# Patient Record
Sex: FEMALE | HISPANIC OR LATINO | URBAN - METROPOLITAN AREA
[De-identification: names, ages, dates, MRNs, and addresses within clinical notes are randomized per-mention and may not be internally consistent; named-entity substitution may affect disease eponyms.]

---

## 2022-12-08 ENCOUNTER — OFFICE (OUTPATIENT)
Dept: URBAN - METROPOLITAN AREA CLINIC 94 | Facility: CLINIC | Age: 32
Setting detail: OPHTHALMOLOGY
End: 2022-12-08
Payer: COMMERCIAL

## 2022-12-08 DIAGNOSIS — H11.153: ICD-10-CM

## 2022-12-08 PROBLEM — E11.9 DM TYPE 1: Status: ACTIVE | Noted: 2022-12-08

## 2022-12-08 PROCEDURE — 92014 COMPRE OPH EXAM EST PT 1/>: CPT | Performed by: OPHTHALMOLOGY

## 2022-12-08 ASSESSMENT — KERATOMETRY
METHOD_AUTO_MANUAL: AUTO
OS_K1POWER_DIOPTERS: 41.00
OD_AXISANGLE_DEGREES: 091
OS_K2POWER_DIOPTERS: 42.50
OD_K1POWER_DIOPTERS: 41.50
OS_AXISANGLE_DEGREES: 090
OD_K2POWER_DIOPTERS: 42.75

## 2022-12-08 ASSESSMENT — CONFRONTATIONAL VISUAL FIELD TEST (CVF)
OD_FINDINGS: FULL
OS_FINDINGS: FULL

## 2022-12-08 ASSESSMENT — VISUAL ACUITY
OS_BCVA: 20/25+1
OD_BCVA: 20/25+

## 2022-12-08 ASSESSMENT — REFRACTION_AUTOREFRACTION
OS_CYLINDER: -0.25
OD_CYLINDER: -0.50
OS_SPHERE: +0.25
OS_AXIS: 016
OD_AXIS: 004
OD_SPHERE: +0.25

## 2022-12-08 ASSESSMENT — TONOMETRY
OS_IOP_MMHG: 17
OD_IOP_MMHG: 15

## 2022-12-08 ASSESSMENT — AXIALLENGTH_DERIVED
OD_AL: 24.108
OS_AL: 24.2013

## 2022-12-08 ASSESSMENT — SPHEQUIV_DERIVED
OD_SPHEQUIV: 0
OS_SPHEQUIV: 0.125

## 2023-04-19 ENCOUNTER — EMERGENCY (EMERGENCY)
Facility: HOSPITAL | Age: 33
LOS: 1 days | Discharge: HOME | End: 2023-04-19
Attending: STUDENT IN AN ORGANIZED HEALTH CARE EDUCATION/TRAINING PROGRAM
Payer: COMMERCIAL

## 2023-04-19 VITALS
RESPIRATION RATE: 20 BRPM | TEMPERATURE: 98 F | DIASTOLIC BLOOD PRESSURE: 81 MMHG | HEART RATE: 100 BPM | SYSTOLIC BLOOD PRESSURE: 140 MMHG | WEIGHT: 184.97 LBS | OXYGEN SATURATION: 98 %

## 2023-04-19 PROCEDURE — 99283 EMERGENCY DEPT VISIT LOW MDM: CPT

## 2023-04-19 PROCEDURE — 0225U NFCT DS DNA&RNA 21 SARSCOV2: CPT

## 2023-04-19 PROCEDURE — 99284 EMERGENCY DEPT VISIT MOD MDM: CPT

## 2023-04-19 NOTE — ED PROVIDER NOTE - NSFOLLOWUPINSTRUCTIONS_ED_ALL_ED_FT
Enfermedades virales en los niños  Viral Illness, Pediatric    Los virus son gérmenes diminutos que entran en el organismo de nina persona y causan enfermedades. Hay muchos tipos de virus diferentes y causan muchas clases de enfermedades. Las enfermedades virales son muy frecuentes en los niños. La mayoría de las enfermedades virales que afectan a los niños no son graves. Mi todas desaparecen sin tratamiento después de algunos días.    Los tipos de virus más comunes que afectan a los niños son los siguientes:    Virus del resfrío y la gripe.  Virus estomacales.  Virus que causan fiebre y erupciones cutáneas. Estos incluyen enfermedades bc el sarampión, la rubéola, la roséola, la quinta enfermedad y la varicela.    Además, las enfermedades virales abarcan afecciones graves, bc la infección por el VIH (virus de inmunodeficiencia humana) y el sida (síndrome de inmunodeficiencia adquirida). Se monsalve identificado unos pocos virus asociados con determinados tipos de cáncer.    ¿Cuáles son las causas?  Muchos tipos de virus pueden causar enfermedades. Los virus invaden las células del organismo del chilo, se multiplican y provocan que las células infectadas funcionen de manera anormal o mueran. Cuando estas células mueren, liberan más virus. Cuando esto ocurre, el chilo tiene síntomas de la enfermedad, y el virus sigue diseminándose a otras células. Si el virus asume la función de la célula, puede hacer que esta se divida y prolifere de manera descontrolada. Margaret ocurre cuando un virus causa cáncer.    Los diferentes virus ingresan al organismo de distintas formas. El chilo es más propenso a contraer un virus si está en contacto con otra persona infectada con un virus. Margaret puede ocurrir en el hogar, en la escuela o en la guardería infantil. El chilo puede contraer un virus de la siguiente forma:    Al inhalar gotitas que nina persona infectada liberó en el aire al toser o estornudar. Los virus del resfrío y de la gripe, así bc aquellos que causan fiebre y erupciones cutáneas, suelen diseminarse a través de estas gotitas.  Al tocar cualquier objeto que tenga el virus (esté contaminado) y luego tocarse la nariz, la boca o los ojos. Los objetos pueden contaminarse con un virus cuando ocurre lo siguiente:    Les caen las gotitas que nina persona infectada liberó al toser o estornudar.  Tuvieron contacto con el vómito o las heces (materia fecal) de nina persona infectada. Los virus estomacales pueden diseminarse a través del vómito o de las heces.  Al consumir un alimento o nina bebida que hayan estado en contacto con el virus.  Al ser jovanna por un insecto o mordido por un animal que son portadores del virus.  Al tener contacto con clyde o líquidos que contienen el virus, ya sea a través de un ward abierto o brook nina transfusión.    ¿Cuáles son los signos o síntomas?  El chilo puede tener los siguientes síntomas, dependiendo del tipo de virus y de la ubicación de las células que invade:    Virus del resfrío y de la gripe:    Fiebre.  Dolor de garganta.  Lexy musculares y de dolor de jay jay.  Congestión nasal.  Dolor de oídos.  Tos.  Virus estomacales:    Fiebre.  Pérdida del apetito.  Vómitos.  Dolor de estómago.  Diarrea.  Virus que causan fiebre y erupciones cutáneas:    Fiebre.  Glándulas inflamadas.  Erupción cutánea.  Secreción nasal.    ¿Cómo se diagnostica?  Esta afección se puede diagnosticar en función de lo siguiente:    Síntomas.  Antecedentes médicos.  Examen físico.  Análisis de clyde, nina muestra de mucosidad de los pulmones (muestra de esputo) o un hisopado de líquidos corporales o nina llaga de la piel (lesión).    ¿Cómo se trata?  La mayoría de las enfermedades virales en los niños desaparecen en el término de 3 a 10 días. En la mayoría de los casos, no se necesita tratamiento. El pediatra puede sugerir que se administren medicamentos de venta anoop para aliviar los síntomas.    Nina enfermedad viral no se puede tratar con antibióticos. Los virus viven adentro de las células, y los antibióticos no pueden penetrar en ellas. En cambio, a veces se usan los antivirales para tratar las enfermedades virales, red elizabeth vez es necesario administrarles estos medicamentos a los niños.    Muchas enfermedades virales de la niñez pueden evitarse con vacunas (inmunizaciones). Estas vacunas ayudan a prevenir la gripe y muchos de los virus que causan fiebre y erupciones cutáneas.    Siga estas instrucciones en jasso casa:      Medicamentos    Adminístrele los medicamentos de venta anoop y los recetados al chilo solamente bc se lo haya indicado el pediatra. Generalmente, no es necesario administrar medicamentos para el resfrío y la gripe. Si el chilo tiene fiebre, pregúntele al médico qué medicamento de venta anoop administrarle y qué cantidad o dosis.  No le dé aspirina al chilo por el riesgo de que contraiga el síndrome de [place].  Si el chilo es mayor de 4 años y tiene tos o dolor de garganta, pregúntele al médico si puede darle gotas para la tos o pastillas para la garganta.  No solicite nina receta de antibióticos si al chilo le diagnosticaron nina enfermedad viral. Los antibióticos no harán que la enfermedad del chilo desaparezca más rápidamente. Además, becka antibióticos con frecuencia cuando no son necesarios puede derivar en resistencia a los antibióticos. Cuando esto ocurre, el medicamento pierde jasso eficacia contra las bacterias que normalmente combate.  Si al chilo le recetaron un medicamento antiviral, adminístreselo bc se lo haya indicado el pediatra. No deje de darle el antiviral al chilo aunque comience a sentirse mejor.        Comida y bebida     Si el chilo tiene vómitos, steve solamente sorbos de líquidos alyce. Ofrézcale sorbos de líquido con frecuencia. Siga las instrucciones del pediatra respecto de las restricciones para las comidas o las bebidas.  Si el chilo puede beber líquidos, ilir que tome la cantidad suficiente para mantener la orina de color amarillo pálido.        Instrucciones generales    Asegúrese de que el chilo descanse lo suficiente.  Si el chilo tiene congestión nasal, pregúntele al pediatra si puede ponerle gotas o un aerosol de solución salina en la nariz.  Si el chilo tiene tos, coloque en jasso habitación un humidificador de vapor frío.  Si el chilo es mayor de 1 año y tiene tos, pregúntele al pediatra si puede darle cucharaditas de miel y con qué frecuencia.  Ilir que el chilo se quede en jasso casa y descanse hasta que los síntomas hayan desaparecido. Ilir que el chilo reanude becky actividades normales bc se lo haya indicado el pediatra. Consulte al pediatra qué actividades son seguras para él.  Concurra a todas las visitas de seguimiento bc se lo haya indicado el pediatra. Margaret es importante.    ¿Cómo se previene?     Para reducir el riesgo de que el chilo tenga nina enfermedad viral:    Enséñele al chilo a lavarse frecuentemente las lesly con agua y jabón brook al menos 20 segundos. Si no dispone de agua y jabón, debe usar un desinfectante para lesly.  Enséñele al chilo a que no se toque la nariz, los ojos y la boca, especialmente si no se ha lavado las lesly recientemente.  Si un miembro de la roseline tiene nina infección viral, limpie todas las superficies de la casa que puedan daniel estado en contacto con el virus. Use [place] y jabón. También puede usar lejía con agua agregada (diluido).  Mantenga al chilo alejado de las personas enfermas con síntomas de nina infección viral.  Enséñele al chilo a no compartir objetos, bc cepillos de dientes y botellas de agua, con otras personas.  Mantenga al día todas las vacunas del chilo.  Ilir que el chilo coma nina dieta jamar y descanse mucho.    Comuníquese con un médico si:  El chilo tiene síntomas de nina enfermedad viral brook más tiempo de lo esperado. Pregúntele al pediatra cuánto tiempo deberían durar los síntomas.  El tratamiento en la casa no controla los síntomas del chilo o estos están empeorando.  El chilo tiene vómitos que stanton más de 24 horas.    Solicite ayuda de inmediato si:  El chilo es roe de 3 meses y tiene fiebre de 100.4 °F (38 °C) o más.  Tiene un chilo de 3 meses a 3 años de edad que presenta fiebre de 102.2 °F (39 °C) o más.  El chilo tiene problemas para respirar.  El chilo tiene dolor de jay jay intenso o rigidez en el ayana.    Estos síntomas pueden representar un problema grave que constituye nina emergencia. No espere a gayle si los síntomas desaparecen. Solicite atención médica de inmediato. Comuníquese con el servicio de emergencias de jasso localidad (911 en los [place]).    Resumen  Los virus son gérmenes diminutos que entran en el organismo de nina persona y causan enfermedades.  La mayoría de las enfermedades virales que afectan a los niños no son graves. Mi todas desaparecen sin tratamiento después de algunos días.  Los síntomas pueden incluir fiebre, dolor de garganta, tos, diarrea o erupción cutánea.  Adminístrele los medicamentos de venta anoop y los recetados al chilo solamente bc se lo haya indicado el pediatra. Generalmente, no es necesario administrar medicamentos para el resfrío y la gripe. Si el chilo tiene fiebre, pregúntele al médico qué medicamento de venta anoop administrarle y qué cantidad.  Comuníquese con el pediatra si el chilo tiene síntomas de nina enfermedad viral brook más tiempo de lo esperado. Pregúntele al pediatra cuánto tiempo deberían durar los síntomas.

## 2023-04-19 NOTE — ED PROVIDER NOTE - PATIENT PORTAL LINK FT
You can access the FollowMyHealth Patient Portal offered by [facility] by registering at the following website: [email or web address]. By joining [employer]’s FollowMyHealth portal, you will also be able to view your health information using other applications (apps) compatible with our system.

## 2023-04-19 NOTE — ED ADULT NURSE REASSESSMENT NOTE - NS ED NURSE REASSESS COMMENT FT1
Pt in no apparent distress at this time. Airway patent, breathing spontaneous and nonlabored. Pt A&Ox3 resting in stretcher. Pt c/o       , cough, congestion, core throat x 4 days. pt triaged, treated and dispo by provider, pt verbalized understanding of discharge instructions,, refer to provider notes.

## 2023-04-19 NOTE — ED PROVIDER NOTE - OBJECTIVE STATEMENT
31 yo female presents for evaluation acute but persistent cough, sore throat, congestion, and recurrent fevers since the 16th. She was seen by a physician and treated with z-sarath and albuterol inhaler. She states that she continues to have fever and coughing.

## 2023-04-19 NOTE — ED ADULT TRIAGE NOTE - CHIEF COMPLAINT QUOTE
productive cough, sore throat , fatigue and congestion x 4 days. went to PCP but hasn't improved. has prescribed azithromcyin.

## 2023-04-20 LAB
HMPV RNA SPEC QL NAA+PROBE: DETECTED
RAPID RVP RESULT: DETECTED
SARS-COV-2 RNA SPEC QL NAA+PROBE: SIGNIFICANT CHANGE UP